# Patient Record
Sex: FEMALE | URBAN - METROPOLITAN AREA
[De-identification: names, ages, dates, MRNs, and addresses within clinical notes are randomized per-mention and may not be internally consistent; named-entity substitution may affect disease eponyms.]

---
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
## 2025-01-30 DIAGNOSIS — E03.1 CONGENITAL HYPOTHYROIDISM: ICD-10-CM

## 2025-01-31 NOTE — TELEPHONE ENCOUNTER
Last Visit: 10/07/2024  Next Visit: 10/07/2025    Received request via: Pharmacy    Was the patient seen in the last year in this department? Yes    Does the patient have an active prescription (recently filled or refills available) for medication(s) requested? No     Pharmacy Name: [id number]

## 2025-02-03 RX ORDER — LEVOTHYROXINE SODIUM 137 UG/1
137 TABLET ORAL
Qty: 30 TABLET | Refills: 0 | Status: SHIPPED | OUTPATIENT
Start: 2025-02-03

## 2025-02-06 DIAGNOSIS — E03.1 CONGENITAL HYPOTHYROIDISM: ICD-10-CM

## 2025-03-19 ENCOUNTER — RESULTS FOLLOW-UP (OUTPATIENT)
Dept: PEDIATRIC ENDOCRINOLOGY | Facility: MEDICAL CENTER | Age: 20
End: 2025-03-19

## 2025-03-19 DIAGNOSIS — E03.1 CONGENITAL HYPOTHYROIDISM: ICD-10-CM

## 2025-03-25 DIAGNOSIS — E03.1 CONGENITAL HYPOTHYROIDISM: ICD-10-CM

## 2025-03-25 RX ORDER — LEVOTHYROXINE SODIUM 137 UG/1
TABLET ORAL
Qty: 20 TABLET | Refills: 5 | Status: SHIPPED | OUTPATIENT
Start: 2025-03-25

## 2025-03-25 NOTE — TELEPHONE ENCOUNTER
Last Visit: 10/07/2024  Next Visit: 10/07/2025    Received request via: Pharmacy    Was the patient seen in the last year in this department? Yes    Does the patient have an active prescription (recently filled or refills available) for medication(s) requested? No     Pharmacy Name: [id number]

## 2025-03-31 DIAGNOSIS — E03.1 CONGENITAL HYPOTHYROIDISM: ICD-10-CM

## 2025-03-31 RX ORDER — LEVOTHYROXINE SODIUM 150 UG/1
TABLET ORAL
Qty: 8 TABLET | Refills: 5 | Status: SHIPPED | OUTPATIENT
Start: 2025-03-31

## 2025-03-31 NOTE — TELEPHONE ENCOUNTER
Last Visit:10/7/24  Next Visit:10/07/25    Received request via: Pharmacy    Was the patient seen in the last year in this department? Yes    Does the patient have an active prescription (recently filled or refills available) for medication(s) requested? No     Pharmacy Name:  [id number] - [place], [place] - [address]

## 2025-05-09 ENCOUNTER — TELEPHONE (OUTPATIENT)
Dept: PEDIATRIC ENDOCRINOLOGY | Facility: MEDICAL CENTER | Age: 20
End: 2025-05-09
Payer: COMMERCIAL

## 2025-05-09 DIAGNOSIS — E03.1 CONGENITAL HYPOTHYROIDISM: ICD-10-CM

## 2025-05-13 NOTE — Clinical Note
REFERRAL APPROVAL NOTICE         Sent on May 13, 2025                   Jen Duckworth  [address] [place] [address]                   Dear Ms. Duckworth,    After a careful review of the medical information and benefit coverage, [facility] has processed your referral. See below for additional details.    If applicable, you must be actively enrolled with your insurance for coverage of the authorized service. If you have any questions regarding your coverage, please contact your insurance directly.    REFERRAL INFORMATION   Referral #:  [id number]  Referred-To Department    Referred-By Provider:  Endocrinology    MARJAN Canales   Endocrinology [facility]      [address]  [address]  [facility] [address] [phone] [address], [address]  [facility] [address] [phone]    Referral Start Date:  05/11/2025  Referral End Date:   05/11/2026           SCHEDULING  If you do not already have an appointment, please call [phone] to make an appointment.   MORE INFORMATION  As a reminder, [facility] ownership has changed, meaning this location is now owned and operated by [facility]. As such, we want to clarify that our patients should expect to receive two separate bills for the services received at [facility] - one representing the [facility] facility fees as the owner of the establishment, and the other to represent the physician's services and subsequent fees. You can speak with your insurance carrier for a pricing estimate by calling the customer service number on the back of your card and ask about charges for a hospital outpatient visit.  If you do not already have a [employer] account, sign up at: [email or web address]  You can access your medical information, make appointments, see lab results, billing information, and more.  If you have questions regarding this referral, please contact  the [facility] Referrals department at:              [phone]. Monday - Friday 7:30AM - 5:00PM.      Sincerely,  [facility]

## 2025-06-18 ENCOUNTER — APPOINTMENT (OUTPATIENT)
Dept: ENDOCRINOLOGY | Facility: MEDICAL CENTER | Age: 20
End: 2025-06-18
Payer: COMMERCIAL

## 2025-07-28 ENCOUNTER — HOSPITAL ENCOUNTER (OUTPATIENT)
Facility: MEDICAL CENTER | Age: 20
End: 2025-07-28
Attending: ANESTHESIOLOGY
Payer: COMMERCIAL

## 2025-07-28 ENCOUNTER — OFFICE VISIT (OUTPATIENT)
Dept: ENDOCRINOLOGY | Facility: MEDICAL CENTER | Age: 20
End: 2025-07-28
Payer: COMMERCIAL

## 2025-07-28 VITALS
OXYGEN SATURATION: 99 % | BODY MASS INDEX: 30.48 KG/M2 | DIASTOLIC BLOOD PRESSURE: 72 MMHG | WEIGHT: 151.2 LBS | SYSTOLIC BLOOD PRESSURE: 114 MMHG | HEART RATE: 81 BPM | HEIGHT: 59 IN

## 2025-07-28 DIAGNOSIS — E03.1 CONGENITAL HYPOTHYROIDISM: Primary | ICD-10-CM

## 2025-07-28 DIAGNOSIS — Z13.21 ENCOUNTER FOR VITAMIN DEFICIENCY SCREENING: ICD-10-CM

## 2025-07-28 DIAGNOSIS — E03.1 CONGENITAL HYPOTHYROIDISM: ICD-10-CM

## 2025-07-28 LAB
25(OH)D3 SERPL-MCNC: 20 NG/ML (ref 30–100)
T3FREE SERPL-MCNC: 3.49 PG/ML (ref 2–4.4)
T4 FREE SERPL-MCNC: 1.77 NG/DL (ref 0.93–1.7)
THYROPEROXIDASE AB SERPL-ACNC: 15.6 IU/ML (ref 0–9)
TSH SERPL DL<=0.005 MIU/L-ACNC: 0.25 UIU/ML (ref 0.38–5.33)

## 2025-07-28 PROCEDURE — 84443 ASSAY THYROID STIM HORMONE: CPT

## 2025-07-28 PROCEDURE — 99213 OFFICE O/P EST LOW 20 MIN: CPT

## 2025-07-28 PROCEDURE — 84481 FREE ASSAY (FT-3): CPT

## 2025-07-28 PROCEDURE — 86376 MICROSOMAL ANTIBODY EACH: CPT

## 2025-07-28 PROCEDURE — 84439 ASSAY OF FREE THYROXINE: CPT

## 2025-07-28 PROCEDURE — 36415 COLL VENOUS BLD VENIPUNCTURE: CPT

## 2025-07-28 PROCEDURE — 3078F DIAST BP <80 MM HG: CPT

## 2025-07-28 PROCEDURE — 99214 OFFICE O/P EST MOD 30 MIN: CPT

## 2025-07-28 PROCEDURE — 3074F SYST BP LT 130 MM HG: CPT

## 2025-07-28 PROCEDURE — 82306 VITAMIN D 25 HYDROXY: CPT

## 2025-07-28 RX ORDER — LEVOTHYROXINE SODIUM 137 MCG
137 TABLET ORAL
Qty: 90 TABLET | Refills: 3 | Status: SHIPPED | OUTPATIENT
Start: 2025-07-28

## 2025-07-28 NOTE — PROGRESS NOTES
Chief Complaint: Consult requested by Laina Colbert M.D. for evaluation of Hypothyroidism    HPI:     Jen Duckworth is a 20 y.o. female with history of Hypothyroidism diagnosed on at birth and is here for initial evaluation.      Congential Hypothyroidism  She reports the following symptoms:fatigue, feeling slow, swelling, and anxiousness which has been present for 1 months.       She denies feeling cold and cold intolerance, constipation, losing hair, anxiousness, feeling excessive energy, tremulousness, palpitations, sweating, and weight loss.    She denies lumps or enlargement in the neck.    She reports a family history of Hypothyroidism with her grandmother.       She is currently on Levothyroxine 150 mcg Tuesday, Thursday.   Levothyroxine 137 mcg MWFSSu daily which has been her thyroid hormone dose since 5 months.       She was previously on brand name synthroid but she was switched due to cost    She denies any recent dose changes.   She reports Good compliance and takes her thyroid hormone daily before breakfast.      She denies taking any iron, calcium supplements or antacids.      Latest Reference Range & Units 03/29/22 11:58 06/29/23 10:17 07/17/24 08:47 10/17/24 06:29 02/05/25 10:42 03/18/25 06:10   TSH 0.450 - 4.500 uIU/mL 10.700 (H) 1.470 4.480 7.640 (H) 11.800 (H) 1.540   Free T-4 0.93 - 1.60 ng/dL 1.40 1.82 (H) 1.44 1.13 1.10 1.50   T3 71 - 180 ng/dL 131  102  94 104       Patient's medications, allergies, and social histories were reviewed and updated as appropriate.      ROS:     CONS:     No fever, no chills, no weight loss, no fatigue   EYES:      No diplopia, no blurry vision, no redness of eyes, no swelling of eyelids   ENT:    No hearing loss, No ear pain, No sore throat, no dysphagia, no neck swelling   CV:     No chest pain, no palpitations, no claudication, no orthopnea, no PND   PULM:    No SOB, no cough, no hemoptysis, no wheezing    GI:   No nausea, no vomiting, no diarrhea, no  constipation, no bloody stools   :  Passing urine well, no dysuria, no hematuria   ENDO:   No polyuria, no polydipsia, no heat intolerance, no cold intolerance   NEURO: No headaches, no dizziness, no convulsions, no tremors   MUSC:  No joint swellings, no arthralgias, no myalgias, no weakness   SKIN:   No rash, no ulcers, no dry skin   PSYCH:   No depression, no anxiety, no difficulty sleeping       Past Medical History:  Patient Active Problem List    Diagnosis Date Noted    Elevated hemoglobin A1c 12/14/2023    Elevated liver function tests 07/19/2023    Dyslipidemia 07/19/2023    Excessive sweating 02/25/2021    Short stature (child) 02/03/2020    Overweight, pediatric, BMI 85.0-94.9 percentile for age 02/03/2020    Panic attack 10/15/2019    Rash 10/15/2019    Dysmenorrhea in adolescent 08/08/2018    Congenital hypothyroidism 07/19/2017    Vitamin D deficiency 07/19/2017       Past Surgical History:  Past Surgical History[1]     Allergies:  Patient has no known allergies.     Current Medications:  Current Medications[2]    Social History:  Social History     Socioeconomic History    Marital status: Single     Spouse name: Not on file    Number of children: Not on file    Years of education: Not on file    Highest education level: Not on file   Occupational History    Not on file   Tobacco Use    Smoking status: Never    Smokeless tobacco: Never   Substance and Sexual Activity    Alcohol use: Not Currently    Drug use: Never    Sexual activity: Yes     Partners: Male   Other Topics Concern    Not on file   Social History Narrative    Not on file     Social Drivers of Health     Financial Resource Strain: Not on file   Food Insecurity: Not on file   Transportation Needs: Not on file   Physical Activity: Not on file   Stress: Not on file   Social Connections: Not on file   Intimate Partner Violence: Not on file   Housing Stability: Not on file        Family History:   History reviewed. No pertinent family  "history.      PHYSICAL EXAM:   Vital signs: /72 (BP Location: Left arm, Patient Position: Sitting, BP Cuff Size: Adult)   Pulse 81   Ht 1.499 m (4' 11\")   Wt 68.6 kg (151 lb 3.2 oz)   SpO2 99%   BMI 30.54 kg/m²   GENERAL: Well-developed, well-nourished  in no apparent distress.   EYE: No ocular and eyelid asymmetry, Anicteric sclerae,  PERRL  HENT: Hearing grossly intact, Normocephalic, atraumatic. Pink, moist mucous membranes, No exudate  NECK: Supple. Trachea midline. thyroid is normal in size without nodules or tenderness  CARDIOVASCULAR: Regular rate and rhythm. No murmurs, rubs, or gallops.   LUNGS: Clear to auscultation bilaterally   ABDOMEN: Soft, nontender with positive bowel sounds.   EXTREMITIES: No clubbing, cyanosis, or edema.   NEUROLOGICAL: Cranial nerves II-XII are grossly intact   Symmetric reflexes at the patella no proximal muscle weakness  LYMPH: No cervical, supraclavicular,  adenopathy palpated.   SKIN: No rashes, lesions. Turgor is normal.    Labs:  Lab Results   Component Value Date/Time    RBC 5.14 09/19/2022 10:04 PM    HEMOGLOBIN 13.6 09/19/2022 10:04 PM    MCV 79.9 (L) 09/19/2022 10:04 PM    MCH 26.4 (L) 09/19/2022 10:04 PM    MCHC 33.1 09/19/2022 10:04 PM    RDW 14.6 (H) 09/19/2022 10:04 PM    MPV 8.1 09/19/2022 10:04 PM       Lab Results   Component Value Date/Time    SODIUM 140 10/17/2024 06:29 AM    POTASSIUM 4.0 10/17/2024 06:29 AM    CHLORIDE 103 10/17/2024 06:29 AM    CO2 22 10/17/2024 06:29 AM    ANION 5 01/28/2023 11:36 AM    GLUCOSE 82 10/17/2024 06:29 AM    BUN 14 10/17/2024 06:29 AM    CREATININE 0.59 10/17/2024 06:29 AM    CALCIUM 9.4 10/17/2024 06:29 AM    ASTSGOT 19 10/17/2024 06:29 AM    ALTSGPT 12 10/17/2024 06:29 AM    TBILIRUBIN 0.4 10/17/2024 06:29 AM    ALBUMIN 4.2 10/17/2024 06:29 AM    TOTPROTEIN 6.7 10/17/2024 06:29 AM    GLOBULIN 2.5 10/17/2024 06:29 AM    AGRATIO 1.5 01/23/2024 05:40 AM       Lab Results   Component Value Date/Time    CHOLSTRLTOT 164 " "10/17/2024 0629    TRIGLYCERIDE 52 10/17/2024 0629    HDL 70 10/17/2024 0629       No results found for: \"TSHULTRASEN\"  No results found for: \"FREET4\"  No results found for: \"FREET3\"  No results found for: \"THYSTIMIG\"    No results found for: \"MICROSOMALA\"      Imaging:      ASSESSMENT/PLAN:     1. Congenital hypothyroidism (Primary)  Unstable  Discussed etiology of congenital hypothyroidism with patient  Medication:  Levothyroxine 137 mcg 5 days - continue  Levothyroxine 150 mcg 2 days - continue  Patient would like to switch over to brand name. I will await the results of blood work to determine one dose and than switch her to synthroid delivers.  I will get updated blood work to determine dose adjustment  Patient understands to take her thyroid hormone on empty stomach prior to breakfast and wait 30 mins to eat.   Patient understands to wait 4 hrs prior to taking iron, calcium, or antacids  Patient understands to stop biotin 5 days prior to blood work  - TSH; Future  - T3 FREE; Future  - FREE THYROXINE; Future  - THYROID PEROXIDASE  (TPO) AB; Future  - VITAMIN D,25 HYDROXY (DEFICIENCY); Future  - Comp Metabolic Panel; Future  I will get US of thyroid to establish baseline  - US-THYROID; Future     Return in about 4 months (around 11/28/2025). Patient will have blood work done prior to follow up in 4 months      Thank you kindly for allowing me to participate in the thyroid care plan for this patient.    Celestine Cassidy, LUCIA   07/28/25    CC:   Laina Colbert M.D.         [1] History reviewed. No pertinent surgical history.  [2]   Current Outpatient Medications:     levothyroxine (SYNTHROID) 150 MCG Tab, Give 1 tablet by mouth every Tuesday and Thursday.  Continue to give your dose of levothyroxine 137 mcg on Monday, Wednesday, Friday, Saturday and Sunday., Disp: 8 Tablet, Rfl: 5    levothyroxine (SYNTHROID) 137 MCG Tab, Take 137 mcg on Monday, Wednesday, Friday, Saturday and Sunday.  Take levothyroxine 150 " mcg on Tuesday and Thursday., Disp: 20 Tablet, Rfl: 5

## 2025-08-25 ENCOUNTER — APPOINTMENT (OUTPATIENT)
Dept: RADIOLOGY | Facility: MEDICAL CENTER | Age: 20
End: 2025-08-25
Payer: COMMERCIAL